# Patient Record
Sex: MALE | URBAN - METROPOLITAN AREA
[De-identification: names, ages, dates, MRNs, and addresses within clinical notes are randomized per-mention and may not be internally consistent; named-entity substitution may affect disease eponyms.]

---

## 2017-05-11 ENCOUNTER — APPOINTMENT (RX ONLY)
Dept: URBAN - METROPOLITAN AREA CLINIC 45 | Facility: CLINIC | Age: 47
Setting detail: DERMATOLOGY
End: 2017-05-11

## 2017-05-11 DIAGNOSIS — L80 VITILIGO: ICD-10-CM

## 2017-05-11 PROCEDURE — ? ORDER TESTS

## 2017-05-11 PROCEDURE — ? PRESCRIPTION

## 2017-05-11 PROCEDURE — ? COUNSELING

## 2017-05-11 PROCEDURE — ? OTHER

## 2017-05-11 PROCEDURE — ? TREATMENT REGIMEN

## 2017-05-11 PROCEDURE — 99202 OFFICE O/P NEW SF 15 MIN: CPT

## 2017-05-11 RX ORDER — HYDROCORTISONE 25 MG/G
CREAM TOPICAL BID
Qty: 1 | Refills: 3 | Status: ERX | COMMUNITY
Start: 2017-05-11

## 2017-05-11 RX ADMIN — HYDROCORTISONE: 25 CREAM TOPICAL at 00:00

## 2017-05-11 ASSESSMENT — LOCATION ZONE DERM: LOCATION ZONE: FACE

## 2017-05-11 ASSESSMENT — LOCATION DETAILED DESCRIPTION DERM: LOCATION DETAILED: LEFT INFERIOR CENTRAL MALAR CHEEK

## 2017-05-11 ASSESSMENT — LOCATION SIMPLE DESCRIPTION DERM: LOCATION SIMPLE: LEFT CHEEK

## 2017-05-11 NOTE — PROCEDURE: OTHER
Other (Free Text): Fluorescent under woods lamp
Note Text (......Xxx Chief Complaint.): This diagnosis correlates with the
Detail Level: Zone

## 2017-05-11 NOTE — PROCEDURE: ORDER TESTS
Lab Facility: [id number]
Expected Date Of Service: 05/11/2017
Performing Laboratory: [id number]
Billing Type: Third-Party Bill
Bill For Surgical Tray: no

## 2017-05-11 NOTE — PROCEDURE: TREATMENT REGIMEN
Samples Given: LaRoche Posay ultra light sunscreen fluid, dry touch sunscreen, dermo kids gentle touch sunscreen
Initiate Treatment: Hydrocortisone cream bid
Detail Level: Zone